# Patient Record
Sex: FEMALE | Race: WHITE | NOT HISPANIC OR LATINO | URBAN - METROPOLITAN AREA
[De-identification: names, ages, dates, MRNs, and addresses within clinical notes are randomized per-mention and may not be internally consistent; named-entity substitution may affect disease eponyms.]

---

## 2023-05-05 ENCOUNTER — NURSING HOME VISIT (OUTPATIENT)
Dept: POST ACUTE CARE | Facility: EXTERNAL LOCATION | Age: 88
End: 2023-05-05
Payer: MEDICARE

## 2023-05-05 DIAGNOSIS — G89.29 OTHER CHRONIC PAIN: ICD-10-CM

## 2023-05-05 DIAGNOSIS — Z79.899 ENCOUNTER FOR MEDICATION REVIEW: Primary | ICD-10-CM

## 2023-05-05 DIAGNOSIS — K59.00 CONSTIPATION, UNSPECIFIED CONSTIPATION TYPE: ICD-10-CM

## 2023-05-05 DIAGNOSIS — M62.81 MUSCLE WEAKNESS: ICD-10-CM

## 2023-05-05 PROCEDURE — 99308 SBSQ NF CARE LOW MDM 20: CPT | Performed by: NURSE PRACTITIONER

## 2023-05-05 NOTE — LETTER
Patient: Nickie Calero  : 1922    Encounter Date: 2023    Subjective   Patient ID: Nickie Calero is a 89 y.o. female who is long term resident being seen and evaluated for multiple medical problems.    HPI   A89 y/o  Female patient with a history of HTN, PMR, OAB, GERD, Cervical Spondylosis, Generalized OA, Insomnia, and Breast Cancer. Resident is currently a LTC at [address]. Resident is poor historian and poor hygiene. Per NP observe in resident's room. She denies chest pain, SOB, breathing comfortable on RA, tolerate PO diet. She is afebrile and no constipation per nursing staff reported.     Active Problems   Problems   · Abdominal pain (789.00) (R10.9)   · Bilateral partial vocal cord paralysis (478.33) (J38.02)   · Bilateral shoulder pain (719.41) (M25.511,M25.512)   · Breast cancer (174.9) (C50.919)   · Cataract (366.9) (H26.9)   · Cervical spondylosis (721.0) (M47.812)   · Chest pain, non-cardiac (786.59) (R07.89)   · Closed fracture of metatarsal bone (825.25) (S92.309A)   · Constipation (564.00) (K59.00)   · Dermatitis, eczematoid (692.9) (L30.9)   · Esophageal reflux (530.81) (K21.9)   · Fatigue, unspecified type (780.79) (R53.83)   · Foot pain (729.5) (M79.673)   · Fracture (829.0) (T14.8XXA)   · Fracture of multiple pubic rami, left, sequela (905.1) (S32.592S)   · Fracture of tibia, distal (824.8) (S82.309A)   · Gait disturbance (781.2) (R26.9)   · Generalized OA (715.00) (M15.9)   · Generalized weakness (780.79) (R53.1)   · Hearing loss (389.9) (H91.90)   · Hernia (553.9) (K46.9)   · Hoarseness of voice (784.42) (R49.0)   · Hypertension (401.9) (I10)   · Insomnia (780.52) (G47.00)   · Joint pain, knee (719.46) (M25.569)   · Left acetabular fracture (808.0) (S32.402A)   · Left shoulder pain (719.41) (M25.512)   · Mastodynia (611.71) (N64.4)   · Mild nausea (787.02) (R11.0)   · Myalgia (729.1) (M79.10)   · OAB (overactive bladder) (596.51) (N32.81)   · Osteopenia  (733.90) (M85.80)   · Osteoporosis (733.00) (M81.0)   · Pain, joint, shoulder (719.41) (M25.519)   · Painful orthopaedic hardware (996.78) (T84.84XA)   · Polymyalgia rheumatica (725) (M35.3)   · Presence of left artificial hip joint (V43.64) (Z96.642)   · Pseudogout (275.49,712.20) (M11.20)   · Right hip pain (719.45) (M25.551)   · Urinary frequency (788.41) (R35.0)   · Visit forscreening mammogram (V76.12) (Z12.31)   · Vitamin D deficiency (268.9) (E55.9)   · Voice disorder (784.40) (R49.9)   Past Medical History   Problems   · History of Arm pain (729.5) (M79.603)   · History of Chest discomfort (786.59) (R07.89)   · Resolved Date: 11 Dec 2019   · History of Elbow fracture (812.40) (S42.409A)   · History of Hemarthrosis (719.10)   · History of abdominal pain (V13.89) (Z87.898)   · Resolved Date: 11 Dec 2019   · History of abdominal pain (V13.89) (Z87.898)   · Resolved Date: 11Dec 2019   · Added by Problem List Migration; 2013-6-9; Moved to [facility] Nov 23 2013 9:01PM   · History of cellulitis (V13.3) (Z87.2)   · Resolved Date: 11 Dec 2019   · History of constipation (V12.79) (Z87.19)   · Resolved Date: 20 Jan 2019   · History of diarrhea (V12.79) (Z87.898)   · Resolved Date: 11 Dec 2019   · History of dizziness (V13.89) (Z87.898)   · Resolved Date: 11 Dec 2019   · History of herpes zoster (V12.09) (Z86.19)   · Resolved Date: 11 Dec 2019   · History of postnasal drip (V13.89)(Z87.898)   · Resolved Date: 11 Dec 2019   · History of Infection due to yeast (112.9) (B37.9)   · Resolved Date: 11 Dec 2019   · History of Pain, wrist joint (719.43) (M25.539)   · History of Urinary dysfunction (788.69) (R39.198)   · Resolved Date: 11Dec 2019   Surgical History   Problems   · History of Hernia Repair   · History of Hip Surgery   · History of Surgical Lysis Of Intestinal Adhesions   Family History   Mother   · Family history of Old age   Father   · Family history of cardiac disorder (V17.49) (Z82.49)   · Family history of  Old age   Family History   · Family history of Hypertension (V17.49)   Social History   Problems   · Hearing loss (389.9) (H91.90)   · Never smoker   · Person living alone (V60.3) (Z60.2)   · Tobacco Non-user   Allergies   Medication   · Cephalexin Monohydrate TABS   Swelling; Recorded By: Genesis Mcmahon; 2/4/2014 10:24:35 PM   · ciprofloxacin   Recorded By: Dorene Mckeon; 2/24/2021 2:01:35 PM   · Lactose   Recorded By: Genesis Mcmahon; 2/4/2014 10:24:35 PM   · theophyllineRecorded By: Dorene Mckeon; 2/24/2021 2:01:36 PM    Review of Systems  Limited due to patient 's dementia   Objective   Weight: 90.3 4/5/2023 18:56  Blood Pressure: 126 /  70 5/1/2023 14:02  Temperature: 97.3 5/1/2023 14:02  Pulse: 65 5/1/2023 14:02  Respirations: 17 5/1/2023 14:02  Blood Sugar:     O2 sats: 96.0 % 5/1/2023 14:02  Height: 61.0 7/18/2022 12:00    Physical Exam  Physical exam  Constitutional: awake, cachexia appearance, afebrile, not in acute distress  Eyes: clear sclera  ENMT: mucous membranes moist  Head/Neck: neck supple, trachea midline, no JVD  Respiratory/Thorax: non labored breathing, breathing comfortable on RA  Cardiovascular: Regular rate  Gastrointestinal: +BS x4, soft, nt/nd/ng/nr  Musculoskeletal: Move all extremities   Extremities: no edema, no cellulitis  Neurological: Awake but confused at baseline   Psychological: irritability   Skin: warm and dry, intact   Lab result on 4/7/23  Basic Metabolic Panl       Glucose  62 mg/dL 74-99 L Final      The American Diabetes Association (ADA) provides guidance for cutoff values for fasting glucose and random glucose. The ADA defines fasting as no caloric intake for at least 8 hours. Fasting plasma glucose results between 100 to 125 mg/dL indicate increased risk for diabetes (prediabetes).  Fasting plasma glucose results greater than or equal to 126 mg/dL meet the criteria for diagnosis of diabetes. In the absence of unequivocal hyperglycemia, results should be confirmed by repeat  testing. In a patient with classic symptoms of hyperglycemia or hyperglycemic crisis, random plasma glucose results greater than or equal to 200 mg/dL meet the criteria for diagnosis of diabetes.  Reference: Standards of Medical Care in Diabetes 2016, American Diabetes Association. Diabetes Care. 2016.39(Suppl 1).       BUN  13 mg/dL 7-21  Final           Creatinine  0.48 mg/dL 0.58-0.96 L Final      Reference ranges for this patient's age group have not been established. These reference ranges reflect verified or established ranges for the adult population. Interpret these ranges with caution using the clinical context and additional reference resources.       Sodium  139 mmol/L 136-144  Final           Potassium  4.1 mmol/L 3.7-5.1  Final      Reference ranges for this patient's age group have not been established. These reference ranges reflect verified or established ranges for the adult population. Interpret these ranges with caution using the clinical context and additional reference resources.       Chloride  103 mmol/L   Final           CO2  24 mmol/L 22-30  Final      Reference ranges for this patient's age group have not been established. These reference ranges reflect verified or established ranges for the adult population. Interpret these ranges with caution using the clinical context and additional reference resources.       Anion Gap  12 mmol/L 9-18  Final      Reference ranges for this patient's age group have not been established. These reference ranges reflect verified or established ranges for the adult population. Interpret these ranges with caution using the clinical context and additional reference resources.       Calcium, Total  9.0 mg/dL 8.5-10.2  Final           Estimated Glomerular Filtration Rate  85 mL/min/1.73 meters squared >=60  Final      Estimated Glomerular Filtration Rate (eGFR) is calculated using the 2021 CKD-EPI creatinine equation. This equation utilizes serum creatinine,  sex, and age as parameters. The creatinine assay has traceable calibration to isotope dilution-mass spectrometry. Refer to KDIGO guidelines for clinical interpretation. In patients with unstable renal function, e.g. those with acute kidney injury, the eGFR may not accurately reflect actual GFR.   Uric Acid  3.6 mg/dL 2.5-6.6  Final   CBC       White Blood Cell Count  5.18 k/uL 3.70-11.00  Final           RBC  4.46 m/uL 3.90-5.20  Final           Hemoglobin  13.6 g/dL 11.5-15.5  Final           Hematocrit  41.1 % 36.0-46.0  Final           MCV  92.2 fL 80.0-100.0  Final           MCH  30.5 pg 26.0-34.0  Final           MCHC  33.1 g/dL 30.5-36.0  Final           RDW-CV  14.0 % 11.5-15.0  Final           Platelet Count  118 k/uL 150-400 L Final           MPV  12.2 fL 9.0-12.7  Final           Absolute nRBC  <0.01 k/uL <0.01  Final      Assessment/Plan    #Encounter for medication review (V58.69) (Z79.899) I will continue to review the medication treatment plan and adjust medications PRN I will continue to educate patient regarding ongoing medication needs. I will monitor for medication GDR PRN   #Muscle Weakness (M62.81) Patient reports worsening physical deconditioning   -PT OT ST PRN   -Restorative Care PRN   - Monitor Labs   #Constipation (K59.00) RN reports patient had Bowel movement this AM. Patient denies abdominal discomfort. I ordered RN to report No Bowel movement greater than 3 days. I will c/w current bowel regimen   -c/w Colace, Miralax, Milk of Magnesium   #Chronic Pain Management: (G89.4), Pain, Unspecified (R52) Patient reports pain is well controlled. I will c/w current medication treatment plan. I will order Nurse to offer nonpharmacological pain control interventions prior to administering as needed pain medication (1) change in position (2) rest (3) deep breathingexercises (4) back rub (5) diversion/distraction   -c/e Tylenol        Electronically Signed By: ZENOBIA Alvarez-CNP   5/11/23   1:35 AM

## 2023-05-05 NOTE — PROGRESS NOTES
Subjective   Patient ID: Nickie Calero is a 89 y.o. female who is long term resident being seen and evaluated for multiple medical problems.    HPI   A89 y/o  Female patient with a history of HTN, PMR, OAB, GERD, Cervical Spondylosis, Generalized OA, Insomnia, and Breast Cancer. Resident is currently a LTC at [address]. Resident is poor historian and poor hygiene. Per NP observe in resident's room. She denies chest pain, SOB, breathing comfortable on RA, tolerate PO diet. She is afebrile and no constipation per nursing staff reported.     Active Problems   Problems   · Abdominal pain (789.00) (R10.9)   · Bilateral partial vocal cord paralysis (478.33) (J38.02)   · Bilateral shoulder pain (719.41) (M25.511,M25.512)   · Breast cancer (174.9) (C50.919)   · Cataract (366.9) (H26.9)   · Cervical spondylosis (721.0) (M47.812)   · Chest pain, non-cardiac (786.59) (R07.89)   · Closed fracture of metatarsal bone (825.25) (S92.309A)   · Constipation (564.00) (K59.00)   · Dermatitis, eczematoid (692.9) (L30.9)   · Esophageal reflux (530.81) (K21.9)   · Fatigue, unspecified type (780.79) (R53.83)   · Foot pain (729.5) (M79.673)   · Fracture (829.0) (T14.8XXA)   · Fracture of multiple pubic rami, left, sequela (905.1) (S32.592S)   · Fracture of tibia, distal (824.8) (S82.309A)   · Gait disturbance (781.2) (R26.9)   · Generalized OA (715.00) (M15.9)   · Generalized weakness (780.79) (R53.1)   · Hearing loss (389.9) (H91.90)   · Hernia (553.9) (K46.9)   · Hoarseness of voice (784.42) (R49.0)   · Hypertension (401.9) (I10)   · Insomnia (780.52) (G47.00)   · Joint pain, knee (719.46) (M25.569)   · Left acetabular fracture (808.0) (S32.402A)   · Left shoulder pain (719.41) (M25.512)   · Mastodynia (611.71) (N64.4)   · Mild nausea (787.02) (R11.0)   · Myalgia (729.1) (M79.10)   · OAB (overactive bladder) (596.51) (N32.81)   · Osteopenia (733.90) (M85.80)   · Osteoporosis (733.00) (M81.0)   · Pain, joint,  shoulder (719.41) (M25.519)   · Painful orthopaedic hardware (996.78) (T84.84XA)   · Polymyalgia rheumatica (725) (M35.3)   · Presence of left artificial hip joint (V43.64) (Z96.642)   · Pseudogout (275.49,712.20) (M11.20)   · Right hip pain (719.45) (M25.551)   · Urinary frequency (788.41) (R35.0)   · Visit forscreening mammogram (V76.12) (Z12.31)   · Vitamin D deficiency (268.9) (E55.9)   · Voice disorder (784.40) (R49.9)   Past Medical History   Problems   · History of Arm pain (729.5) (M79.603)   · History of Chest discomfort (786.59) (R07.89)   · Resolved Date: 11 Dec 2019   · History of Elbow fracture (812.40) (S42.409A)   · History of Hemarthrosis (719.10)   · History of abdominal pain (V13.89) (Z87.898)   · Resolved Date: 11 Dec 2019   · History of abdominal pain (V13.89) (Z87.898)   · Resolved Date: 11Dec 2019   · Added by Problem List Migration; 2013-6-9; Moved to [facility] Nov 23 2013 9:01PM   · History of cellulitis (V13.3) (Z87.2)   · Resolved Date: 11 Dec 2019   · History of constipation (V12.79) (Z87.19)   · Resolved Date: 20 Jan 2019   · History of diarrhea (V12.79) (Z87.898)   · Resolved Date: 11 Dec 2019   · History of dizziness (V13.89) (Z87.898)   · Resolved Date: 11 Dec 2019   · History of herpes zoster (V12.09) (Z86.19)   · Resolved Date: 11 Dec 2019   · History of postnasal drip (V13.89)(Z87.898)   · Resolved Date: 11 Dec 2019   · History of Infection due to yeast (112.9) (B37.9)   · Resolved Date: 11 Dec 2019   · History of Pain, wrist joint (719.43) (M25.539)   · History of Urinary dysfunction (788.69) (R39.198)   · Resolved Date: 11Dec 2019   Surgical History   Problems   · History of Hernia Repair   · History of Hip Surgery   · History of Surgical Lysis Of Intestinal Adhesions   Family History   Mother   · Family history of Old age   Father   · Family history of cardiac disorder (V17.49) (Z82.49)   · Family history of Old age   Family History   · Family history of Hypertension (V17.49)    Social History   Problems   · Hearing loss (389.9) (H91.90)   · Never smoker   · Person living alone (V60.3) (Z60.2)   · Tobacco Non-user   Allergies   Medication   · Cephalexin Monohydrate TABS   Swelling; Recorded By: Genesis Mcmahon; 2/4/2014 10:24:35 PM   · ciprofloxacin   Recorded By: Dorene Mckeon; 2/24/2021 2:01:35 PM   · Lactose   Recorded By: Genesis Mcmahon; 2/4/2014 10:24:35 PM   · theophyllineRecorded By: Dorene Mckeon; 2/24/2021 2:01:36 PM    Review of Systems  Limited due to patient 's dementia   Objective   Weight: 90.3 4/5/2023 18:56  Blood Pressure: 126 /  70 5/1/2023 14:02  Temperature: 97.3 5/1/2023 14:02  Pulse: 65 5/1/2023 14:02  Respirations: 17 5/1/2023 14:02  Blood Sugar:     O2 sats: 96.0 % 5/1/2023 14:02  Height: 61.0 7/18/2022 12:00    Physical Exam  Physical exam  Constitutional: awake, cachexia appearance, afebrile, not in acute distress  Eyes: clear sclera  ENMT: mucous membranes moist  Head/Neck: neck supple, trachea midline, no JVD  Respiratory/Thorax: non labored breathing, breathing comfortable on RA  Cardiovascular: Regular rate  Gastrointestinal: +BS x4, soft, nt/nd/ng/nr  Musculoskeletal: Move all extremities   Extremities: no edema, no cellulitis  Neurological: Awake but confused at baseline   Psychological: irritability   Skin: warm and dry, intact   Lab result on 4/7/23  Basic Metabolic Panl       Glucose  62 mg/dL 74-99 L Final      The American Diabetes Association (ADA) provides guidance for cutoff values for fasting glucose and random glucose. The ADA defines fasting as no caloric intake for at least 8 hours. Fasting plasma glucose results between 100 to 125 mg/dL indicate increased risk for diabetes (prediabetes).  Fasting plasma glucose results greater than or equal to 126 mg/dL meet the criteria for diagnosis of diabetes. In the absence of unequivocal hyperglycemia, results should be confirmed by repeat testing. In a patient with classic symptoms of hyperglycemia or hyperglycemic  crisis, random plasma glucose results greater than or equal to 200 mg/dL meet the criteria for diagnosis of diabetes.  Reference: Standards of Medical Care in Diabetes 2016, American Diabetes Association. Diabetes Care. 2016.39(Suppl 1).       BUN  13 mg/dL 7-21  Final           Creatinine  0.48 mg/dL 0.58-0.96 L Final      Reference ranges for this patient's age group have not been established. These reference ranges reflect verified or established ranges for the adult population. Interpret these ranges with caution using the clinical context and additional reference resources.       Sodium  139 mmol/L 136-144  Final           Potassium  4.1 mmol/L 3.7-5.1  Final      Reference ranges for this patient's age group have not been established. These reference ranges reflect verified or established ranges for the adult population. Interpret these ranges with caution using the clinical context and additional reference resources.       Chloride  103 mmol/L   Final           CO2  24 mmol/L 22-30  Final      Reference ranges for this patient's age group have not been established. These reference ranges reflect verified or established ranges for the adult population. Interpret these ranges with caution using the clinical context and additional reference resources.       Anion Gap  12 mmol/L 9-18  Final      Reference ranges for this patient's age group have not been established. These reference ranges reflect verified or established ranges for the adult population. Interpret these ranges with caution using the clinical context and additional reference resources.       Calcium, Total  9.0 mg/dL 8.5-10.2  Final           Estimated Glomerular Filtration Rate  85 mL/min/1.73 meters squared >=60  Final      Estimated Glomerular Filtration Rate (eGFR) is calculated using the 2021 CKD-EPI creatinine equation. This equation utilizes serum creatinine, sex, and age as parameters. The creatinine assay has traceable calibration to  isotope dilution-mass spectrometry. Refer to KDIGO guidelines for clinical interpretation. In patients with unstable renal function, e.g. those with acute kidney injury, the eGFR may not accurately reflect actual GFR.   Uric Acid  3.6 mg/dL 2.5-6.6  Final   CBC       White Blood Cell Count  5.18 k/uL 3.70-11.00  Final           RBC  4.46 m/uL 3.90-5.20  Final           Hemoglobin  13.6 g/dL 11.5-15.5  Final           Hematocrit  41.1 % 36.0-46.0  Final           MCV  92.2 fL 80.0-100.0  Final           MCH  30.5 pg 26.0-34.0  Final           MCHC  33.1 g/dL 30.5-36.0  Final           RDW-CV  14.0 % 11.5-15.0  Final           Platelet Count  118 k/uL 150-400 L Final           MPV  12.2 fL 9.0-12.7  Final           Absolute nRBC  <0.01 k/uL <0.01  Final      Assessment/Plan    #Encounter for medication review (V58.69) (Z79.899) I will continue to review the medication treatment plan and adjust medications PRN I will continue to educate patient regarding ongoing medication needs. I will monitor for medication GDR PRN   #Muscle Weakness (M62.81) Patient reports worsening physical deconditioning   -PT OT ST PRN   -Restorative Care PRN   - Monitor Labs   #Constipation (K59.00) RN reports patient had Bowel movement this AM. Patient denies abdominal discomfort. I ordered RN to report No Bowel movement greater than 3 days. I will c/w current bowel regimen   -c/w Colace, Miralax, Milk of Magnesium   #Chronic Pain Management: (G89.4), Pain, Unspecified (R52) Patient reports pain is well controlled. I will c/w current medication treatment plan. I will order Nurse to offer nonpharmacological pain control interventions prior to administering as needed pain medication (1) change in position (2) rest (3) deep breathingexercises (4) back rub (5) diversion/distraction   -c/e Tylenol

## 2023-05-11 PROBLEM — M62.81 MUSCLE WEAKNESS: Status: ACTIVE | Noted: 2023-05-11

## 2023-05-11 PROBLEM — G89.29 OTHER CHRONIC PAIN: Status: ACTIVE | Noted: 2023-05-11

## 2023-05-11 PROBLEM — Z79.899 ENCOUNTER FOR MEDICATION REVIEW: Status: ACTIVE | Noted: 2023-05-11

## 2023-05-11 PROBLEM — K59.00 CONSTIPATION: Status: ACTIVE | Noted: 2023-05-11
